# Patient Record
Sex: MALE | Race: ASIAN | Employment: FULL TIME | URBAN - METROPOLITAN AREA
[De-identification: names, ages, dates, MRNs, and addresses within clinical notes are randomized per-mention and may not be internally consistent; named-entity substitution may affect disease eponyms.]

---

## 2018-05-02 ENCOUNTER — OFFICE VISIT (OUTPATIENT)
Dept: FAMILY MEDICINE CLINIC | Facility: CLINIC | Age: 48
End: 2018-05-02
Payer: COMMERCIAL

## 2018-05-02 ENCOUNTER — HOSPITAL ENCOUNTER (OUTPATIENT)
Dept: RADIOLOGY | Facility: HOSPITAL | Age: 48
Discharge: HOME | End: 2018-05-02
Payer: COMMERCIAL

## 2018-05-02 VITALS
HEART RATE: 82 BPM | BODY MASS INDEX: 22.88 KG/M2 | TEMPERATURE: 97.7 F | RESPIRATION RATE: 18 BRPM | SYSTOLIC BLOOD PRESSURE: 116 MMHG | HEIGHT: 64 IN | OXYGEN SATURATION: 98 % | WEIGHT: 134 LBS | DIASTOLIC BLOOD PRESSURE: 76 MMHG

## 2018-05-02 DIAGNOSIS — R05.9 COUGH: ICD-10-CM

## 2018-05-02 DIAGNOSIS — J30.1 SEASONAL ALLERGIC RHINITIS DUE TO POLLEN: Primary | ICD-10-CM

## 2018-05-02 PROBLEM — J30.9 ALLERGIC RHINITIS: Status: ACTIVE | Noted: 2018-05-02

## 2018-05-02 PROCEDURE — 71046 X-RAY EXAM CHEST 2 VIEWS: CPT

## 2018-05-02 PROCEDURE — 99203 OFFICE O/P NEW LOW 30 MIN: CPT | Performed by: NURSE PRACTITIONER

## 2018-05-02 PROCEDURE — 3008F BODY MASS INDEX DOCD: CPT | Performed by: NURSE PRACTITIONER

## 2018-05-02 PROCEDURE — 1036F TOBACCO NON-USER: CPT | Performed by: NURSE PRACTITIONER

## 2018-05-02 RX ORDER — MOMETASONE FUROATE 50 UG/1
2 SPRAY, METERED NASAL DAILY
Qty: 17 G | Refills: 0 | Status: SHIPPED | OUTPATIENT
Start: 2018-05-02

## 2018-05-02 NOTE — ASSESSMENT & PLAN NOTE
To begin over-the-counter antihistamine such as size all or Claritin in addition to Nasonex  To follow up if symptoms do not begin to improve

## 2018-05-02 NOTE — PROGRESS NOTES
Assessment/Plan:    Allergic rhinitis   To begin over-the-counter antihistamine such as size all or Claritin in addition to Nasonex  To follow up if symptoms do not begin to improve  Cough  Lungs clear on exam and patient afebrile  Requesting x-ray  Will call with results       Diagnoses and all orders for this visit:    Seasonal allergic rhinitis due to pollen  -     mometasone (NASONEX) 50 mcg/act nasal spray; 2 sprays into each nostril daily    Cough  -     XR chest pa & lateral; Future          Subjective:      Patient ID: Syl Sanchez is a 41 y o  male  Loc presents for an initial visit  He denies any significant past medical history or past surgical history  He has not been connected to a doctor for several years  He does report developing a dry cough about 2 weeks ago  Loc is also having  itchy eyes and nasal congestion  Denies fever, chills, shortness of breath, wheezing, mucus production, sick contacts, asthma or pneumonia  He does work at a nail salon  He feels that his cough is worse at work as well as at nighttime  He has treated his symptoms by drinking hot water, this provided some relief  Requesting a chest x-ray  The following portions of the patient's history were reviewed and updated as appropriate:   He  has no past medical history on file  He   Patient Active Problem List    Diagnosis Date Noted    Allergic rhinitis 05/02/2018    Cough 05/02/2018     He  has no past surgical history on file  His family history includes Heart disease in his mother; Hyperlipidemia in his father  He  reports that he has never smoked  He has never used smokeless tobacco  He reports that he does not drink alcohol or use drugs  Current Outpatient Prescriptions   Medication Sig Dispense Refill    mometasone (NASONEX) 50 mcg/act nasal spray 2 sprays into each nostril daily 17 g 0     No current facility-administered medications for this visit        No current outpatient prescriptions on file prior to visit  No current facility-administered medications on file prior to visit  He has No Known Allergies       Review of Systems   Constitutional: Negative  HENT: Positive for congestion  Eyes: Positive for itching  Respiratory: Positive for cough  Negative for shortness of breath and wheezing  Cardiovascular: Negative  Gastrointestinal: Negative  Genitourinary: Negative  Neurological: Negative  Psychiatric/Behavioral: Negative  /76   Pulse 82   Temp 97 7 °F (36 5 °C)   Resp 18   Ht 5' 4" (1 626 m)   Wt 60 8 kg (134 lb)   SpO2 98%   BMI 23 00 kg/m²     Objective:     Physical Exam   Constitutional: He is oriented to person, place, and time  He appears well-developed and well-nourished  HENT:   Head: Normocephalic and atraumatic  Right Ear: Tympanic membrane and external ear normal    Left Ear: External ear normal    Mouth/Throat: Uvula is midline and oropharynx is clear and moist  Normal dentition  No oropharyngeal exudate  Nasal mucosa pale and boggy   Eyes: Conjunctivae are normal  Pupils are equal, round, and reactive to light  Neck: Neck supple  No thyromegaly present  Cardiovascular: Normal rate and regular rhythm  No murmur heard  Pulmonary/Chest: Effort normal and breath sounds normal  No respiratory distress  He has no wheezes  He has no rales  He exhibits no tenderness  No cough on exam    Abdominal: Soft  Bowel sounds are normal  He exhibits no distension and no mass  There is no tenderness  There is no rebound and no guarding  Musculoskeletal: Normal range of motion  Lymphadenopathy:     He has no cervical adenopathy  Neurological: He is alert and oriented to person, place, and time  Skin: Skin is warm and dry  Psychiatric: He has a normal mood and affect   His behavior is normal  Judgment and thought content normal

## 2023-09-06 ENCOUNTER — OFFICE VISIT (OUTPATIENT)
Dept: FAMILY MEDICINE CLINIC | Facility: CLINIC | Age: 53
End: 2023-09-06
Payer: MEDICARE

## 2023-09-06 VITALS
OXYGEN SATURATION: 98 % | BODY MASS INDEX: 23.16 KG/M2 | WEIGHT: 139 LBS | SYSTOLIC BLOOD PRESSURE: 130 MMHG | RESPIRATION RATE: 16 BRPM | TEMPERATURE: 96.6 F | HEIGHT: 65 IN | DIASTOLIC BLOOD PRESSURE: 86 MMHG | HEART RATE: 74 BPM

## 2023-09-06 DIAGNOSIS — G44.229 CHRONIC TENSION-TYPE HEADACHE, NOT INTRACTABLE: ICD-10-CM

## 2023-09-06 DIAGNOSIS — R53.82 CHRONIC FATIGUE: ICD-10-CM

## 2023-09-06 DIAGNOSIS — Z12.11 COLON CANCER SCREENING: ICD-10-CM

## 2023-09-06 DIAGNOSIS — Z11.59 NEED FOR HEPATITIS C SCREENING TEST: ICD-10-CM

## 2023-09-06 DIAGNOSIS — Z13.1 SCREENING FOR DIABETES MELLITUS: ICD-10-CM

## 2023-09-06 DIAGNOSIS — Z11.4 SCREENING FOR HIV (HUMAN IMMUNODEFICIENCY VIRUS): ICD-10-CM

## 2023-09-06 DIAGNOSIS — R68.89 FORGETFULNESS: ICD-10-CM

## 2023-09-06 DIAGNOSIS — Z00.01 ENCOUNTER FOR GENERAL ADULT MEDICAL EXAMINATION WITH ABNORMAL FINDINGS: Primary | ICD-10-CM

## 2023-09-06 DIAGNOSIS — Z12.5 PROSTATE CANCER SCREENING ENCOUNTER, OPTIONS AND RISKS DISCUSSED: ICD-10-CM

## 2023-09-06 PROBLEM — J30.9 ALLERGIC RHINITIS: Status: RESOLVED | Noted: 2018-05-02 | Resolved: 2023-09-06

## 2023-09-06 PROCEDURE — 99396 PREV VISIT EST AGE 40-64: CPT | Performed by: FAMILY MEDICINE

## 2023-09-06 PROCEDURE — 99204 OFFICE O/P NEW MOD 45 MIN: CPT | Performed by: FAMILY MEDICINE

## 2023-09-06 NOTE — PROGRESS NOTES
ADULT ANNUAL PHYSICAL  [address] [facility]    NAME: Sabas Welch  AGE: 50 y.o. SEX: male  : 1970     DATE: 2023     Assessment and Plan:     Problem List Items Addressed This Visit    None  Visit Diagnoses     Encounter for general adult medical examination with abnormal findings    -  Primary    Relevant Orders    CBC and differential    Comprehensive metabolic panel    Lipid panel    TSH, 3rd generation with Free T4 reflex    Screening for diabetes mellitus        Relevant Orders    Hemoglobin A1C    Need for hepatitis C screening test        Relevant Orders    Hepatitis C Antibody    Screening for HIV (human immunodeficiency virus)        Relevant Orders    HIV 1/2 AG/AB w Reflex [facility] for 2 yr old and above    Colon cancer screening        Relevant Orders    Ambulatory Referral to Gastroenterology        Failed clock draw, cannot do standardized test as he did not have schooling in [facility] , but can do simple mathematics and normal 3 word recall. 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 Follow up after labs. Immunizations and preventive care screenings were discussed with patient today. Appropriate education was printed on patient's after visit summary. Discussed risks and benefits of prostate cancer screening. We discussed the controversial history of PSA screening for prostate cancer in the [facility] as well as the risk of over detection and over treatment of prostate cancer by way of PSA screening. The patient understands that PSA blood testing is an imperfect way to screen for prostate cancer and that elevated PSA levels in the blood may also be caused by infection, inflammation, prostatic trauma or manipulation, urological procedures, or by benign prostatic enlargement.     The role of the digital rectal examination in prostate cancer screening was also discussed and I discussed with him that there is large interobserver variability in the findings of digital rectal examination. Counseling:  Alcohol/drug use: discussed moderation in alcohol intake, the recommendations for healthy alcohol use, and avoidance of illicit drug use. Dental Health: discussed importance of regular tooth brushing, flossing, and dental visits. Injury prevention: discussed safety/seat belts, safety helmets, smoke detectors, carbon dioxide detectors, and smoking near bedding or upholstery. Sexual health: discussed sexually transmitted diseases, partner selection, use of condoms, avoidance of unintended pregnancy, and contraceptive alternatives. · Exercise: the importance of regular exercise/physical activity was discussed. Recommend exercise 3-5 times per week for at least 30 minutes. Depression Screening and Follow-up Plan: Patient was screened for depression during today's encounter. They screened negative with a PHQ-2 score of 0. No follow-ups on file. Chief Complaint:     Chief Complaint   Patient presents with   • Physical Exam   • Headache      History of Present Illness:     Adult Annual Physical   Patient here for a comprehensive physical exam. The patient reports problems - forgetting things, stress, no alcohol use, intermittent headaches, no recent vision check, family issues. Diet and Physical Activity  · Diet/Nutrition: well balanced diet and consuming 3-5 servings of fruits/vegetables daily. · Exercise: moderate cardiovascular exercise. Depression Screening  PHQ-2/9 Depression Screening    Little interest or pleasure in doing things: 0 - not at all  Feeling down, depressed, or hopeless: 0 - not at all  PHQ-2 Score: 0  PHQ-2 Interpretation: Negative depression screen       General Health  · Sleep: sleeps well. · Hearing: grossly normal.  · Vision: most recent eye exam >1 year ago and wears glasses.    · Dental: no dental visits for >1 year and brushes teeth twice daily.  Health  · Symptoms include: none     Review of Systems:     Review of Systems   Constitutional: Positive for fatigue. Negative for chills and fever. HENT: Negative for congestion, rhinorrhea and sore throat. Eyes: Negative for discharge, redness and itching. Respiratory: Negative for chest tightness, shortness of breath and wheezing. Cardiovascular: Negative for chest pain and palpitations. Gastrointestinal: Negative for abdominal pain, constipation and diarrhea. Genitourinary: Negative for dysuria. Skin: Negative for pallor and rash. Neurological: Positive for headaches. Negative for dizziness, weakness and numbness. Memory problems        Past Medical History:     Past Medical History:   Diagnosis Date   • Allergic rhinitis 5/2/2018      Past Surgical History:     History reviewed. No pertinent surgical history. Family History:     Family History   Problem Relation Age of Onset   • Heart disease Mother    • Hyperlipidemia Father       Social History:     Social History     Socioeconomic History   • Marital status: /Civil Union     Spouse name: None   • Number of children: None   • Years of education: None   • Highest education level: None   Occupational History   • None   Tobacco Use   • Smoking status: Never   • Smokeless tobacco: Never   Vaping Use   • Vaping Use: Never used   Substance and Sexual Activity   • Alcohol use: No   • Drug use: No   • Sexual activity: Yes     Partners: Female   Other Topics Concern   • None   Social History Narrative   • None     Social Determinants of Health     Financial Resource Strain: Not on file   Food Insecurity: Not on file   Transportation Needs: Not on file   Physical Activity: Not on file   Stress: Not on file   Social Connections: Not on file   Intimate Partner Violence: Not on file   Housing Stability: Not on file      Current Medications:     No current outpatient medications on file.      No current facility-administered medications for this visit. Allergies:     No Known Allergies   Physical Exam:     /86 (BP Location: Left arm, Patient Position: Sitting, Cuff Size: Adult)   Pulse 74   Temp (!) 96.6 °F (35.9 °C) (Tympanic)   Resp 16   Ht 5' 5" (1.651 m)   Wt 63 kg (139 lb)   SpO2 98%   BMI 23.13 kg/m²     Physical Exam  Vitals and nursing note reviewed. Constitutional:       General: He is not in acute distress. Appearance: Normal appearance. He is well-developed and normal weight. He is not ill-appearing or toxic-appearing. HENT:      Head: Normocephalic and atraumatic. Right Ear: Tympanic membrane, ear canal and external ear normal.      Left Ear: Tympanic membrane, ear canal and external ear normal.      Nose: No mucosal edema or rhinorrhea. Mouth/Throat:      Mouth: Mucous membranes are not pale, dry and not cyanotic. Pharynx: Oropharynx is clear. No oropharyngeal exudate or posterior oropharyngeal erythema. Eyes:      General: No scleral icterus. Right eye: No discharge. Left eye: No discharge. Extraocular Movements: Extraocular movements intact. Conjunctiva/sclera: Conjunctivae normal.      Pupils: Pupils are equal, round, and reactive to light. Cardiovascular:      Rate and Rhythm: Normal rate and regular rhythm. Heart sounds: Normal heart sounds. No murmur heard. No gallop. Pulmonary:      Effort: Pulmonary effort is normal. No respiratory distress. Breath sounds: Normal breath sounds. No wheezing or rales. Abdominal:      General: Abdomen is flat. Palpations: Abdomen is soft. Tenderness: There is no abdominal tenderness. Musculoskeletal:         General: No swelling, tenderness, deformity or signs of injury. Cervical back: Normal range of motion. Right lower leg: No edema. Left lower leg: No edema. Skin:     General: Skin is warm. Coloration: Skin is not jaundiced or pale. Findings: No rash. Neurological:      General: No focal deficit present. Mental Status: He is alert and oriented to person, place, and time. Cranial Nerves: No cranial nerve deficit. Sensory: No sensory deficit. Motor: No weakness. Coordination: Coordination normal.      Gait: Gait normal.      Deep Tendon Reflexes: Reflexes normal.      Comments: Failed clock draw  No formal education in English- high school in Portuguese     Psychiatric:         Mood and Affect: Mood normal.         Behavior: Behavior normal.         Thought Content:  Thought content normal.         Judgment: Judgment normal.          Lexii Fernandes MD  [id number] [facility]

## 2023-09-11 ENCOUNTER — APPOINTMENT (OUTPATIENT)
Dept: LAB | Facility: HOSPITAL | Age: 53
End: 2023-09-11
Attending: FAMILY MEDICINE
Payer: MEDICARE

## 2023-09-11 DIAGNOSIS — Z11.59 NEED FOR HEPATITIS C SCREENING TEST: ICD-10-CM

## 2023-09-11 DIAGNOSIS — Z13.1 SCREENING FOR DIABETES MELLITUS: ICD-10-CM

## 2023-09-11 DIAGNOSIS — Z11.4 SCREENING FOR HIV (HUMAN IMMUNODEFICIENCY VIRUS): ICD-10-CM

## 2023-09-11 DIAGNOSIS — R53.82 CHRONIC FATIGUE: ICD-10-CM

## 2023-09-11 DIAGNOSIS — Z00.01 ENCOUNTER FOR GENERAL ADULT MEDICAL EXAMINATION WITH ABNORMAL FINDINGS: ICD-10-CM

## 2023-09-11 DIAGNOSIS — R68.89 FORGETFULNESS: ICD-10-CM

## 2023-09-11 DIAGNOSIS — Z12.5 PROSTATE CANCER SCREENING ENCOUNTER, OPTIONS AND RISKS DISCUSSED: ICD-10-CM

## 2023-09-11 LAB
25(OH)D3 SERPL-MCNC: 18.6 NG/ML (ref 30–100)
ALBUMIN SERPL BCP-MCNC: 4.5 G/DL (ref 3.5–5)
ALP SERPL-CCNC: 65 U/L (ref 34–104)
ALT SERPL W P-5'-P-CCNC: 13 U/L (ref 7–52)
ANION GAP SERPL CALCULATED.3IONS-SCNC: 7 MMOL/L
AST SERPL W P-5'-P-CCNC: 15 U/L (ref 13–39)
BASOPHILS # BLD AUTO: 0.03 THOUSANDS/ÂΜL (ref 0–0.1)
BASOPHILS NFR BLD AUTO: 1 % (ref 0–1)
BILIRUB SERPL-MCNC: 0.78 MG/DL (ref 0.2–1)
BUN SERPL-MCNC: 13 MG/DL (ref 5–25)
CALCIUM SERPL-MCNC: 9.6 MG/DL (ref 8.4–10.2)
CHLORIDE SERPL-SCNC: 102 MMOL/L (ref 96–108)
CHOLEST SERPL-MCNC: 197 MG/DL
CO2 SERPL-SCNC: 29 MMOL/L (ref 21–32)
CREAT SERPL-MCNC: 0.78 MG/DL (ref 0.6–1.3)
EOSINOPHIL # BLD AUTO: 0.23 THOUSAND/ÂΜL (ref 0–0.61)
EOSINOPHIL NFR BLD AUTO: 4 % (ref 0–6)
ERYTHROCYTE [DISTWIDTH] IN BLOOD BY AUTOMATED COUNT: 12.5 % (ref 11.6–15.1)
EST. AVERAGE GLUCOSE BLD GHB EST-MCNC: 140 MG/DL
FOLATE SERPL-MCNC: 14.4 NG/ML
GFR SERPL CREATININE-BSD FRML MDRD: 105 ML/MIN/1.73SQ M
GLUCOSE P FAST SERPL-MCNC: 117 MG/DL (ref 65–99)
HBA1C MFR BLD: 6.5 %
HCT VFR BLD AUTO: 44.7 % (ref 36.5–49.3)
HCV AB SER QL: NORMAL
HDLC SERPL-MCNC: 40 MG/DL
HGB BLD-MCNC: 14.6 G/DL (ref 12–17)
HIV 1+2 AB+HIV1 P24 AG SERPL QL IA: NORMAL
HIV 2 AB SERPL QL IA: NORMAL
HIV1 AB SERPL QL IA: NORMAL
HIV1 P24 AG SERPL QL IA: NORMAL
IMM GRANULOCYTES # BLD AUTO: 0.02 THOUSAND/UL (ref 0–0.2)
IMM GRANULOCYTES NFR BLD AUTO: 0 % (ref 0–2)
LDLC SERPL CALC-MCNC: 131 MG/DL (ref 0–100)
LYMPHOCYTES # BLD AUTO: 1.75 THOUSANDS/ÂΜL (ref 0.6–4.47)
LYMPHOCYTES NFR BLD AUTO: 34 % (ref 14–44)
MCH RBC QN AUTO: 28.5 PG (ref 26.8–34.3)
MCHC RBC AUTO-ENTMCNC: 32.7 G/DL (ref 31.4–37.4)
MCV RBC AUTO: 87 FL (ref 82–98)
MONOCYTES # BLD AUTO: 0.35 THOUSAND/ÂΜL (ref 0.17–1.22)
MONOCYTES NFR BLD AUTO: 7 % (ref 4–12)
NEUTROPHILS # BLD AUTO: 2.82 THOUSANDS/ÂΜL (ref 1.85–7.62)
NEUTS SEG NFR BLD AUTO: 54 % (ref 43–75)
NONHDLC SERPL-MCNC: 157 MG/DL
NRBC BLD AUTO-RTO: 0 /100 WBCS
PLATELET # BLD AUTO: 222 THOUSANDS/UL (ref 149–390)
PMV BLD AUTO: 10.3 FL (ref 8.9–12.7)
POTASSIUM SERPL-SCNC: 3.9 MMOL/L (ref 3.5–5.3)
PROT SERPL-MCNC: 8 G/DL (ref 6.4–8.4)
RBC # BLD AUTO: 5.12 MILLION/UL (ref 3.88–5.62)
SODIUM SERPL-SCNC: 138 MMOL/L (ref 135–147)
TRIGL SERPL-MCNC: 129 MG/DL
TSH SERPL DL<=0.05 MIU/L-ACNC: 0.8 UIU/ML (ref 0.45–4.5)
VIT B12 SERPL-MCNC: 302 PG/ML (ref 180–914)
WBC # BLD AUTO: 5.2 THOUSAND/UL (ref 4.31–10.16)

## 2023-09-11 PROCEDURE — 84154 ASSAY OF PSA FREE: CPT

## 2023-09-11 PROCEDURE — 82607 VITAMIN B-12: CPT

## 2023-09-11 PROCEDURE — 80061 LIPID PANEL: CPT

## 2023-09-11 PROCEDURE — 86803 HEPATITIS C AB TEST: CPT

## 2023-09-11 PROCEDURE — 84153 ASSAY OF PSA TOTAL: CPT

## 2023-09-11 PROCEDURE — 36415 COLL VENOUS BLD VENIPUNCTURE: CPT

## 2023-09-11 PROCEDURE — 87389 HIV-1 AG W/HIV-1&-2 AB AG IA: CPT

## 2023-09-11 PROCEDURE — 82746 ASSAY OF FOLIC ACID SERUM: CPT

## 2023-09-11 PROCEDURE — 83036 HEMOGLOBIN GLYCOSYLATED A1C: CPT

## 2023-09-11 PROCEDURE — 82306 VITAMIN D 25 HYDROXY: CPT

## 2023-09-11 PROCEDURE — 80053 COMPREHEN METABOLIC PANEL: CPT

## 2023-09-11 PROCEDURE — 85025 COMPLETE CBC W/AUTO DIFF WBC: CPT

## 2023-09-11 PROCEDURE — 84443 ASSAY THYROID STIM HORMONE: CPT

## 2023-09-12 LAB
PSA FREE MFR SERPL: 50 %
PSA FREE SERPL-MCNC: 0.3 NG/ML
PSA SERPL-MCNC: 0.6 NG/ML (ref 0–4)

## 2023-09-19 ENCOUNTER — OFFICE VISIT (OUTPATIENT)
Dept: FAMILY MEDICINE CLINIC | Facility: CLINIC | Age: 53
End: 2023-09-19
Payer: MEDICARE

## 2023-09-19 VITALS
TEMPERATURE: 97.4 F | HEIGHT: 65 IN | OXYGEN SATURATION: 98 % | WEIGHT: 138 LBS | BODY MASS INDEX: 22.99 KG/M2 | SYSTOLIC BLOOD PRESSURE: 132 MMHG | DIASTOLIC BLOOD PRESSURE: 82 MMHG | HEART RATE: 74 BPM | RESPIRATION RATE: 14 BRPM

## 2023-09-19 DIAGNOSIS — E11.9 TYPE 2 DIABETES MELLITUS WITHOUT COMPLICATION, WITHOUT LONG-TERM CURRENT USE OF INSULIN (HCC): Primary | ICD-10-CM

## 2023-09-19 DIAGNOSIS — R06.83 SNORING: ICD-10-CM

## 2023-09-19 DIAGNOSIS — R51.9 MORNING HEADACHE: ICD-10-CM

## 2023-09-19 DIAGNOSIS — Z23 ENCOUNTER FOR IMMUNIZATION: ICD-10-CM

## 2023-09-19 DIAGNOSIS — R06.81 WITNESSED EPISODE OF APNEA: ICD-10-CM

## 2023-09-19 DIAGNOSIS — Z91.89 AT RISK FOR OBSTRUCTIVE SLEEP APNEA: ICD-10-CM

## 2023-09-19 PROCEDURE — 90471 IMMUNIZATION ADMIN: CPT | Performed by: FAMILY MEDICINE

## 2023-09-19 PROCEDURE — 90472 IMMUNIZATION ADMIN EACH ADD: CPT | Performed by: FAMILY MEDICINE

## 2023-09-19 PROCEDURE — 90750 HZV VACC RECOMBINANT IM: CPT | Performed by: FAMILY MEDICINE

## 2023-09-19 PROCEDURE — 99214 OFFICE O/P EST MOD 30 MIN: CPT | Performed by: FAMILY MEDICINE

## 2023-09-19 PROCEDURE — 90677 PCV20 VACCINE IM: CPT | Performed by: FAMILY MEDICINE

## 2023-09-19 RX ORDER — ATORVASTATIN CALCIUM 10 MG/1
10 TABLET, FILM COATED ORAL
Qty: 30 TABLET | Refills: 5 | Status: SHIPPED | OUTPATIENT
Start: 2023-09-19

## 2023-09-19 NOTE — PATIENT INSTRUCTIONS
You have been diagnosed with type 2 diabetes as discussed today. Low-carb diet cut back on rice. Start taking metformin 30 minutes before breakfast daily in the morning. He will start taking it lovastatin 10 mg daily at bedtime. When you go for eye checkup today have him fill out the form. You will call and schedule the colonoscopy  You will check your feet daily for any breaks in skin and moisturize her feet daily. I have ordered the sleep study today so we will call and schedule the sleep study to rule out sleep apnea.   If the study is positive you may need another study with a CPAP machine which she might have to wear while sleeping  You will obtain blood work in 3 months, follow-up with me after the blood work

## 2023-09-19 NOTE — PROGRESS NOTES
Subjective:      Patient ID: Tanya Rhodes is a 50 y.o. male. Here for follow up on labs  But also states that he had episodes where he gasps for air and stops breathing at night, wife reports he snores    STOP-Bang Questionnaire    Is it possible that you have . .. Obstructive Sleep Apnea (DULCE MARIA)? Please answer the following questions below to determine if you might be at risk. Yes=1? No=0  ? Snoring ? Do you Snore Loudly (loud enough to be heard through closed doors or your bed-partner elbows you for snoring at night)? Yes=1   ? Tired ? Do you often feel Tired, Fatigued, or Sleepy during the daytime (such as falling asleep during driving or talking to someone)? Yes=1   ? Observed ? Has anyone Observed you Stop Breathing or Choking/Gasping during your sleep ? Yes=1,  ? Pressure ? Do you have or are being treated for High Blood Pressure ?   ,No=0  ? Body Mass Index more than 35 kg/m2? Body mass index is 22.96 kg/m². ?      No=0  ? Age older than 48 ? Yes=1? Neck size large ? (Measured around [place])  For male, is your shirt collar 17 inches / 43cm or larger? For female, is your shirt collar 16 inches / 41cm or larger? No=0  ? Gender = Male ? Yes=1,    For general population     DULCE MARIA - High Risk : Yes to 5 - 8 questions    If the STOP-Bang score is 5 vs. 0-2, the risk of obstructive sleep apnea is 5 fold. Past Medical History:   Diagnosis Date   • Allergic rhinitis 5/2/2018       Family History   Problem Relation Age of Onset   • Heart disease Mother    • Hyperlipidemia Father        History reviewed. No pertinent surgical history. reports that he has never smoked. He has never used smokeless tobacco. He reports that he does not drink alcohol and does not use drugs.       Current Outpatient Medications:   •  atorvastatin (LIPITOR) 10 mg tablet, Take 1 tablet (10 mg total) by mouth daily at bedtime, Disp: 30 tablet, Rfl: 5  •  metFORMIN (GLUCOPHAGE) 500 mg tablet, Take 1 tablet (500 mg total) by mouth daily with breakfast, Disp: 90 tablet, Rfl: 1    The following portions of the patient's history were reviewed and updated as appropriate: allergies, current medications, past family history, past medical history, past social history, past surgical history and problem list.    Review of Systems   Constitutional: Negative for chills and fever. HENT: Negative for congestion, rhinorrhea and sore throat. Eyes: Negative for discharge, redness and itching. Respiratory: Negative for chest tightness, shortness of breath and wheezing. Cardiovascular: Negative for chest pain and palpitations. Gastrointestinal: Negative for abdominal pain, constipation and diarrhea. Genitourinary: Negative for dysuria. Skin: Negative for pallor and rash. Neurological: Negative for dizziness, weakness, numbness and headaches. Psychiatric/Behavioral: Positive for sleep disturbance. PHQ-2/9 Depression Screening    Little interest or pleasure in doing things: 0 - not at all  Feeling down, depressed, or hopeless: 0 - not at all  PHQ-2 Score: 0  PHQ-2 Interpretation: Negative depression screen             Objective:    /82 (BP Location: Left arm, Patient Position: Sitting, Cuff Size: Adult)   Pulse 74   Temp (!) 97.4 °F (36.3 °C) (Tympanic)   Resp 14   Ht 5' 5" (1.651 m)   Wt 62.6 kg (138 lb)   SpO2 98%   BMI 22.96 kg/m²      Physical Exam  Vitals and nursing note reviewed. Constitutional:       Appearance: Normal appearance. HENT:      Right Ear: External ear normal.      Left Ear: External ear normal.   Eyes:      General:         Right eye: No discharge. Left eye: No discharge. Conjunctiva/sclera: Conjunctivae normal.   Cardiovascular:      Rate and Rhythm: Normal rate and regular rhythm. Pulses: no weak pulses          Dorsalis pedis pulses are 2+ on the right side and 2+ on the left side. Heart sounds: No murmur heard.   Pulmonary:      Effort: Pulmonary effort is normal.      Breath sounds: Normal breath sounds. No wheezing. Abdominal:      General: There is no distension. Palpations: Abdomen is soft. Tenderness: There is no abdominal tenderness. Musculoskeletal:      Right lower leg: No edema. Left lower leg: No edema. Feet:      Right foot:      Skin integrity: No ulcer, skin breakdown, erythema, warmth, callus or dry skin. Left foot:      Skin integrity: No ulcer, skin breakdown, erythema, warmth, callus or dry skin. Skin:     Findings: No lesion or rash. Neurological:      Mental Status: He is alert. Mental status is at baseline. Psychiatric:         Mood and Affect: Mood normal.         Thought Content: Thought content normal.       Patient's shoes and socks removed. Right Foot/Ankle   Right Foot Inspection  Skin Exam: skin normal. Skin not intact, no dry skin, no warmth, no callus, no erythema, no maceration, no abnormal color, no pre-ulcer, no ulcer and no callus. Toe Exam: ROM and strength within normal limits. Sensory   Monofilament testing: intact    Vascular  Capillary refills: < 3 seconds  The right DP pulse is 2+. Right Toe  - Comprehensive Exam  Ecchymosis: none  Swelling: none   Tenderness: none         Left Foot/Ankle  Left Foot Inspection  Skin Exam: skin normal. Skin not intact, no dry skin, no warmth, no erythema, no maceration, normal color, no pre-ulcer, no ulcer and no callus. Toe Exam: ROM and strength within normal limits. Sensory   Monofilament testing: intact    Vascular  Capillary refills: < 3 seconds  The left DP pulse is 2+.      Left Toe  - Comprehensive Exam  Ecchymosis: none  Swelling: none   Tenderness: none           Assign Risk Category  No deformity present  No loss of protective sensation  No weak pulses  Risk: 0          Recent Results (from the past 8736 hour(s))   CBC and differential    Collection Time: 09/11/23  9:08 AM   Result Value Ref Range    WBC 5.20 4.31 - 10.16 Thousand/uL    RBC 5.12 3.88 - 5.62 Million/uL    Hemoglobin 14.6 12.0 - 17.0 g/dL    Hematocrit 44.7 36.5 - 49.3 %    MCV 87 82 - 98 fL    MCH 28.5 26.8 - 34.3 pg    MCHC 32.7 31.4 - 37.4 g/dL    RDW 12.5 11.6 - 15.1 %    MPV 10.3 8.9 - 12.7 fL    Platelets 776 520 - 986 Thousands/uL    nRBC 0 /100 WBCs    Neutrophils Relative 54 43 - 75 %    Immat GRANS % 0 0 - 2 %    Lymphocytes Relative 34 14 - 44 %    Monocytes Relative 7 4 - 12 %    Eosinophils Relative 4 0 - 6 %    Basophils Relative 1 0 - 1 %    Neutrophils Absolute 2.82 1.85 - 7.62 Thousands/µL    Immature Grans Absolute 0.02 0.00 - 0.20 Thousand/uL    Lymphocytes Absolute 1.75 0.60 - 4.47 Thousands/µL    Monocytes Absolute 0.35 0.17 - 1.22 Thousand/µL    Eosinophils Absolute 0.23 0.00 - 0.61 Thousand/µL    Basophils Absolute 0.03 0.00 - 0.10 Thousands/µL   Comprehensive metabolic panel    Collection Time: 09/11/23  9:08 AM   Result Value Ref Range    Sodium 138 135 - 147 mmol/L    Potassium 3.9 3.5 - 5.3 mmol/L    Chloride 102 96 - 108 mmol/L    CO2 29 21 - 32 mmol/L    ANION GAP 7 mmol/L    BUN 13 5 - 25 mg/dL    Creatinine 0.78 0.60 - 1.30 mg/dL    Glucose, Fasting 117 (H) 65 - 99 mg/dL    Calcium 9.6 8.4 - 10.2 mg/dL    AST 15 13 - 39 U/L    ALT 13 7 - 52 U/L    Alkaline Phosphatase 65 34 - 104 U/L    Total Protein 8.0 6.4 - 8.4 g/dL    Albumin 4.5 3.5 - 5.0 g/dL    Total Bilirubin 0.78 0.20 - 1.00 mg/dL    eGFR 105 ml/min/1.73sq m   Lipid panel    Collection Time: 09/11/23  9:08 AM   Result Value Ref Range    Cholesterol 197 See Comment mg/dL    Triglycerides 129 See Comment mg/dL    HDL, Direct 40 >=40 mg/dL    LDL Calculated 131 (H) 0 - 100 mg/dL    Non-HDL-Chol (CHOL-HDL) 157 mg/dl   Hemoglobin A1C    Collection Time: 09/11/23  9:08 AM   Result Value Ref Range    Hemoglobin A1C 6.5 (H) Normal 4.0-5.6%; PreDiabetic 5.7-6.4%;  Diabetic >=6.5%; Glycemic control for adults with diabetes <7.0% %     mg/dl   TSH, 3rd generation with Free T4 reflex    Collection Time: 09/11/23  9:08 AM   Result Value Ref Range    TSH 3RD GENERATON 0.798 0.450 - 4.500 uIU/mL   Hepatitis C Antibody    Collection Time: 09/11/23  9:08 AM   Result Value Ref Range    Hepatitis C Ab Non-reactive Non-Reactive   HIV 1/2 AG/AB w Reflex [facility] for 2 yr old and above    Collection Time: 09/11/23  9:08 AM   Result Value Ref Range    HIV-1 p24 Antigen Non-Reactive Non-Reactive    HIV-1 Antibody Non-Reactive Non-Reactive    HIV-2 Antibody Non-Reactive Non-Reactive    HIV Ag-Ab 5th Gen Non-Reactive Non-Reactive   PSA, total and free    Collection Time: 09/11/23  9:08 AM   Result Value Ref Range    Prostate Specific Antigen Total 0.6 0.0 - 4.0 ng/mL    PSA, Free 0.30 N/A ng/mL    PSA, Free Pct 50.0 %   Vitamin D 25 hydroxy    Collection Time: 09/11/23  9:08 AM   Result Value Ref Range    Vit D, 25-Hydroxy 18.6 (L) 30.0 - 100.0 ng/mL   Vitamin B12    Collection Time: 09/11/23  9:08 AM   Result Value Ref Range    Vitamin B-12 302 180 - 914 pg/mL   Folate    Collection Time: 09/11/23  9:08 AM   Result Value Ref Range    Folate 14.4 >5.9 ng/mL       Laboratory Results: I have personally reviewed the pertinent laboratory results/reports     Radiology/Other Diagnostic Testing Results: I have personally reviewed pertinent reports. XR chest pa & lateral    Result Date: 5/2/2018  CHEST INDICATION:   R05: Cough. COMPARISON:  None EXAM PERFORMED/VIEWS:  XR CHEST PA & LATERAL FINDINGS: Cardiomediastinal silhouette appears unremarkable. The lungs are clear. No pneumothorax or pleural effusion. Osseous structures appear within normal limits for patient age.      No acute consolidation or congestion seen Workstation performed: [id number]        Assessment/Plan:  Problem List Items Addressed This Visit    None  Visit Diagnoses     Type 2 diabetes mellitus without complication, without long-term current use of insulin (HCC)    -  Primary    Relevant Medications    metFORMIN (GLUCOPHAGE) 500 mg tablet    atorvastatin (LIPITOR) 10 mg tablet    Other Relevant Orders    Hemoglobin A1C    Comprehensive metabolic panel    Snoring        Relevant Orders    Diagnostic Sleep Study    Witnessed episode of apnea        Relevant Orders    Diagnostic Sleep Study    Morning headache        Relevant Orders    Diagnostic Sleep Study    At risk for obstructive sleep apnea        Relevant Orders    Diagnostic Sleep Study    Encounter for immunization        Relevant Orders    Zoster Vaccine Recombinant IM    Pneumococcal Conjugate Vaccine 20-valent (Pcv20)            At risk for sleep apnea, obtain sleep study    Patient has new onset type 2 diabetes-discussed and given handout for diabetic low glycemic foods, foot care, symptoms of hyper and hypoglycemia and goals of diabetes control. Discussed importance of annual diabetic eye exam, urine check and Pneumovax once before 75 yo with patient. I will start patient on metformin 500 mg with breakfast. Type 2 diabetes mellitus with A1C-6.5, discussed ascvd RISK OF >10% warrants statin, which is prescribed today-atorvastatin 10 mg daily. Common side effects of the medications discussed, maintain good personal hygiene to avoid UTIs,  He has eye exam scheduled for today and will get the form filled from them. shigrix and pcv-20 today  F/u in 3months           Read package inserts for all medications before starting a new medications, call me if you have any questions. Patient was given opportunity to ask questions and all questions were answered. Disclaimer: Portions of the record may have been created with voice recognition software. Occasional wrong word or "sound a like" substitutions may have occurred due to the inherent limitations of voice recognition software. Read the chart carefully and recognize, using context, where substitutions have occurred. I have used the Epic copy/forward function to compose this note.  I have reviewed my current note to ensure it reflects the current patient status, exam, assessment and plan.

## 2023-11-14 ENCOUNTER — TELEPHONE (OUTPATIENT)
Age: 53
End: 2023-11-14

## 2023-11-14 NOTE — TELEPHONE ENCOUNTER
11/14/23  Screened by: Jessi Barr    Referring Provider Kristy Neville    Pre- Screening: There is no height or weight on file to calculate BMI. Has patient been referred for a routine screening Colonoscopy? yes  Is the patient between 43-73 years old? yes      Previous Colonoscopy no   If yes:    Date:     Facility:     Reason:       SCHEDULING STAFF: If the patient is between 45yrs-49yrs, please advise patient to confirm benefits/coverage with their insurance company for a routine screening colonoscopy, some insurance carriers will only cover at [address] [place] [address] or older. If the patient is over 66years old, please schedule an office visit. Does the patient want to see a Gastroenterologist prior to their procedure OR are they having any GI symptoms? no    Has the patient been hospitalized or had abdominal surgery in the past 6 months? no    Does the patient use supplemental oxygen? no    Does the patient take Coumadin, Lovenox, Plavix, Elliquis, Xarelto, or other blood thinning medication? no    Has the patient had a stroke, cardiac event, or stent placed in the past year? no    PT PASSED OA    SCHEDULING STAFF: If patient answers NO to above questions, then schedule procedure. If patient answers YES to above questions, then schedule office appointment. If patient is between 45yrs - 49yrs, please advise patient that we will have to confirm benefits & coverage with their insurance company for a routine screening colonoscopy.

## 2023-12-11 ENCOUNTER — APPOINTMENT (OUTPATIENT)
Dept: LAB | Facility: HOSPITAL | Age: 53
End: 2023-12-11
Attending: FAMILY MEDICINE
Payer: MEDICARE

## 2023-12-11 DIAGNOSIS — E11.9 TYPE 2 DIABETES MELLITUS WITHOUT COMPLICATION, WITHOUT LONG-TERM CURRENT USE OF INSULIN (HCC): ICD-10-CM

## 2023-12-11 LAB
ALBUMIN SERPL BCP-MCNC: 4.6 G/DL (ref 3.5–5)
ALP SERPL-CCNC: 71 U/L (ref 34–104)
ALT SERPL W P-5'-P-CCNC: 18 U/L (ref 7–52)
ANION GAP SERPL CALCULATED.3IONS-SCNC: 7 MMOL/L
AST SERPL W P-5'-P-CCNC: 17 U/L (ref 13–39)
BILIRUB SERPL-MCNC: 0.67 MG/DL (ref 0.2–1)
BUN SERPL-MCNC: 11 MG/DL (ref 5–25)
CALCIUM SERPL-MCNC: 9.8 MG/DL (ref 8.4–10.2)
CHLORIDE SERPL-SCNC: 103 MMOL/L (ref 96–108)
CO2 SERPL-SCNC: 29 MMOL/L (ref 21–32)
CREAT SERPL-MCNC: 0.74 MG/DL (ref 0.6–1.3)
EST. AVERAGE GLUCOSE BLD GHB EST-MCNC: 131 MG/DL
GFR SERPL CREATININE-BSD FRML MDRD: 106 ML/MIN/1.73SQ M
GLUCOSE P FAST SERPL-MCNC: 115 MG/DL (ref 65–99)
HBA1C MFR BLD: 6.2 %
POTASSIUM SERPL-SCNC: 4 MMOL/L (ref 3.5–5.3)
PROT SERPL-MCNC: 7.9 G/DL (ref 6.4–8.4)
SODIUM SERPL-SCNC: 139 MMOL/L (ref 135–147)

## 2023-12-11 PROCEDURE — 36415 COLL VENOUS BLD VENIPUNCTURE: CPT

## 2023-12-11 PROCEDURE — 83036 HEMOGLOBIN GLYCOSYLATED A1C: CPT

## 2023-12-11 PROCEDURE — 80053 COMPREHEN METABOLIC PANEL: CPT

## 2024-02-21 PROBLEM — R05.9 COUGH: Status: RESOLVED | Noted: 2018-05-02 | Resolved: 2024-02-21

## 2024-03-21 DIAGNOSIS — E11.9 TYPE 2 DIABETES MELLITUS WITHOUT COMPLICATION, WITHOUT LONG-TERM CURRENT USE OF INSULIN (HCC): ICD-10-CM

## 2024-03-26 DIAGNOSIS — E11.9 TYPE 2 DIABETES MELLITUS WITHOUT COMPLICATION, WITHOUT LONG-TERM CURRENT USE OF INSULIN (HCC): ICD-10-CM

## 2024-03-26 RX ORDER — ATORVASTATIN CALCIUM 10 MG/1
10 TABLET, FILM COATED ORAL
Qty: 30 TABLET | Refills: 5 | Status: SHIPPED | OUTPATIENT
Start: 2024-03-26

## 2024-06-19 DIAGNOSIS — Z12.11 COLON CANCER SCREENING: Primary | ICD-10-CM

## 2024-06-20 ENCOUNTER — OFFICE VISIT (OUTPATIENT)
Dept: FAMILY MEDICINE CLINIC | Facility: CLINIC | Age: 54
End: 2024-06-20
Payer: MEDICARE

## 2024-06-20 VITALS
SYSTOLIC BLOOD PRESSURE: 114 MMHG | BODY MASS INDEX: 22.88 KG/M2 | OXYGEN SATURATION: 98 % | HEIGHT: 64 IN | WEIGHT: 134 LBS | HEART RATE: 68 BPM | DIASTOLIC BLOOD PRESSURE: 80 MMHG | TEMPERATURE: 97.2 F | RESPIRATION RATE: 16 BRPM

## 2024-06-20 DIAGNOSIS — J45.990 EXERCISE-INDUCED ASTHMA: Primary | ICD-10-CM

## 2024-06-20 DIAGNOSIS — J30.1 SEASONAL ALLERGIC RHINITIS DUE TO POLLEN: ICD-10-CM

## 2024-06-20 PROCEDURE — 99214 OFFICE O/P EST MOD 30 MIN: CPT | Performed by: FAMILY MEDICINE

## 2024-06-20 RX ORDER — FLUTICASONE PROPIONATE 50 MCG
1 SPRAY, SUSPENSION (ML) NASAL DAILY
Qty: 18 G | Refills: 0 | Status: SHIPPED | OUTPATIENT
Start: 2024-06-20

## 2024-06-20 RX ORDER — CETIRIZINE HYDROCHLORIDE 10 MG/1
10 TABLET ORAL DAILY
Qty: 30 TABLET | Refills: 2 | Status: SHIPPED | OUTPATIENT
Start: 2024-06-20

## 2024-06-20 RX ORDER — ALBUTEROL SULFATE 90 UG/1
2 AEROSOL, METERED RESPIRATORY (INHALATION) EVERY 6 HOURS PRN
Qty: 6.7 G | Refills: 1 | Status: SHIPPED | OUTPATIENT
Start: 2024-06-20

## 2024-06-20 NOTE — PATIENT INSTRUCTIONS
It is possible that you have exercise-induced asthma.  Start taking albuterol inhaler 2 puffs 20 minutes before exercising or exercising or for shortness of breath or wheezing.  You can repeat this in 4 to 6 hours.    You possibly have underlying seasonal allergies you can start using cetirizine 10 mg daily and Flonase nasal spray to decrease the nasal secretions.  In order to use the Flonase appointment nozzle inside the nostril towards the ear of that side and gently sniff.  Do not sniff too hard as you will swallow the medication instead of allowing it to work in the nostril.

## 2024-07-03 LAB — COLOGUARD RESULT REPORTABLE: NEGATIVE

## 2024-07-30 DIAGNOSIS — J45.990 EXERCISE-INDUCED ASTHMA: ICD-10-CM

## 2024-07-30 RX ORDER — ALBUTEROL SULFATE 90 UG/1
2 AEROSOL, METERED RESPIRATORY (INHALATION) EVERY 6 HOURS PRN
Qty: 6.7 G | Refills: 1 | Status: SHIPPED | OUTPATIENT
Start: 2024-07-30

## 2024-08-19 DIAGNOSIS — J45.990 EXERCISE-INDUCED ASTHMA: ICD-10-CM

## 2024-08-19 DIAGNOSIS — J30.1 SEASONAL ALLERGIC RHINITIS DUE TO POLLEN: ICD-10-CM

## 2024-08-20 RX ORDER — CETIRIZINE HYDROCHLORIDE 10 MG/1
10 TABLET ORAL DAILY
Qty: 30 TABLET | Refills: 5 | Status: SHIPPED | OUTPATIENT
Start: 2024-08-20

## 2024-08-27 DIAGNOSIS — J30.1 SEASONAL ALLERGIC RHINITIS DUE TO POLLEN: ICD-10-CM

## 2024-08-28 RX ORDER — FLUTICASONE PROPIONATE 50 MCG
SPRAY, SUSPENSION (ML) NASAL
Qty: 16 ML | Refills: 1 | Status: SHIPPED | OUTPATIENT
Start: 2024-08-28

## 2024-09-11 DIAGNOSIS — E11.9 TYPE 2 DIABETES MELLITUS WITHOUT COMPLICATION, WITHOUT LONG-TERM CURRENT USE OF INSULIN (HCC): ICD-10-CM

## 2024-09-19 NOTE — PROGRESS NOTES
Problem: PAIN - ADULT  Goal: Verbalizes/displays adequate comfort level or baseline comfort level  Description: Interventions:  - Encourage patient to monitor pain and request assistance  - Assess pain using appropriate pain scale  - Administer analgesics based on type and severity of pain and evaluate response  - Implement non-pharmacological measures as appropriate and evaluate response  - Consider cultural and social influences on pain and pain management  - Notify physician/advanced practitioner if interventions unsuccessful or patient reports new pain  Outcome: Progressing     Problem: Nutrition/Hydration-ADULT  Goal: Nutrient/Hydration intake appropriate for improving, restoring or maintaining nutritional needs  Description: Monitor and assess patient's nutrition/hydration status for malnutrition. Collaborate with interdisciplinary team and initiate plan and interventions as ordered.  Monitor patient's weight and dietary intake as ordered or per policy. Utilize nutrition screening tool and intervene as necessary. Determine patient's food preferences and provide high-protein, high-caloric foods as appropriate.     INTERVENTIONS:  - Monitor oral intake, urinary output, labs, and treatment plans  - Assess nutrition and hydration status and recommend course of action  - Evaluate amount of meals eaten  - Assist patient with eating if necessary   - Allow adequate time for meals  - Recommend/ encourage appropriate diets, oral nutritional supplements, and vitamin/mineral supplements  - Order, calculate, and assess calorie counts as needed  - Recommend, monitor, and adjust tube feedings and TPN/PPN based on assessed needs  - Assess need for intravenous fluids  - Provide specific nutrition/hydration education as appropriate  - Include patient/family/caregiver in decisions related to nutrition  Outcome: Progressing        Subjective:      Patient ID: Marcos Washington is a 53 y.o. male.    Reports that for the past 2 weeks he has been feeling short of breath when he exercises.  He denies any chest pain.  He reports dry cough.  He states that he usually exercises before sleeping and that makes him sleep better.  Cough is mainly bothersome at night.    Shortness of Breath  Pertinent negatives include no chest pain, dizziness, palpitations, rhinorrhea, sore throat or wheezing.       Past Medical History:   Diagnosis Date   • Allergic rhinitis 5/2/2018       Family History   Problem Relation Age of Onset   • Heart disease Mother    • Hyperlipidemia Father        History reviewed. No pertinent surgical history.     reports that he has never smoked. He has never used smokeless tobacco. He reports that he does not drink alcohol and does not use drugs.      Current Outpatient Medications:   •  albuterol (Proventil HFA) 90 mcg/act inhaler, Inhale 2 puffs every 6 (six) hours as needed for wheezing or shortness of breath (20 min before exercise), Disp: 6.7 g, Rfl: 1  •  atorvastatin (LIPITOR) 10 mg tablet, TAKE 1 TABLET BY MOUTH DAILY AT BEDTIME, Disp: 30 tablet, Rfl: 5  •  cetirizine (ZyrTEC) 10 mg tablet, Take 1 tablet (10 mg total) by mouth daily, Disp: 30 tablet, Rfl: 2  •  fluticasone (FLONASE) 50 mcg/act nasal spray, 1 spray into each nostril daily, Disp: 18 g, Rfl: 0  •  metFORMIN (GLUCOPHAGE) 500 mg tablet, TAKE 1 TABLET BY MOUTH EVERY DAY WITH BREAKFAST, Disp: 90 tablet, Rfl: 1    The following portions of the patient's history were reviewed and updated as appropriate: allergies, current medications, past family history, past medical history, past social history, past surgical history and problem list.    Review of Systems   Constitutional:  Negative for chills and fever.   HENT:  Negative for congestion, rhinorrhea and sore throat.    Eyes:  Negative for discharge, redness and itching.   Respiratory:  Positive for shortness of breath.  "Negative for chest tightness and wheezing.    Cardiovascular:  Negative for chest pain and palpitations.   Gastrointestinal:  Negative for abdominal pain, constipation and diarrhea.   Genitourinary:  Negative for dysuria.   Skin:  Negative for pallor and rash.   Neurological:  Negative for dizziness, weakness, numbness and headaches.         PHQ-2/9 Depression Screening    Little interest or pleasure in doing things: 0 - not at all  Feeling down, depressed, or hopeless: 0 - not at all  PHQ-2 Score: 0  PHQ-2 Interpretation: Negative depression screen             Objective:    /80 (BP Location: Left arm, Patient Position: Sitting, Cuff Size: Adult)   Pulse 68   Temp (!) 97.2 °F (36.2 °C) (Tympanic)   Resp 16   Ht 5' 4\" (1.626 m)   Wt 60.8 kg (134 lb)   SpO2 98%   BMI 23.00 kg/m²      Physical Exam  Vitals and nursing note reviewed.   Constitutional:       Appearance: Normal appearance.   HENT:      Head: Normocephalic and atraumatic.      Right Ear: Tympanic membrane, ear canal and external ear normal.      Left Ear: Tympanic membrane, ear canal and external ear normal.      Nose: Congestion and rhinorrhea present.      Right Turbinates: Enlarged, swollen and pale.      Left Turbinates: Enlarged, swollen and pale.      Mouth/Throat:      Mouth: Mucous membranes are moist.      Pharynx: No posterior oropharyngeal erythema.   Eyes:      General:         Right eye: No discharge.         Left eye: No discharge.      Conjunctiva/sclera: Conjunctivae normal.   Cardiovascular:      Rate and Rhythm: Normal rate and regular rhythm.      Heart sounds: No murmur heard.  Pulmonary:      Effort: Pulmonary effort is normal.      Breath sounds: Normal breath sounds. No wheezing.   Abdominal:      General: There is no distension.      Palpations: Abdomen is soft.      Tenderness: There is no abdominal tenderness.   Musculoskeletal:      Right lower leg: No edema.      Left lower leg: No edema.   Skin:     Findings: No " lesion or rash.   Neurological:      Mental Status: He is alert. Mental status is at baseline.   Psychiatric:         Mood and Affect: Mood normal.         Thought Content: Thought content normal.         Recent Results (from the past 8736 hour(s))   CBC and differential    Collection Time: 09/11/23  9:08 AM   Result Value Ref Range    WBC 5.20 4.31 - 10.16 Thousand/uL    RBC 5.12 3.88 - 5.62 Million/uL    Hemoglobin 14.6 12.0 - 17.0 g/dL    Hematocrit 44.7 36.5 - 49.3 %    MCV 87 82 - 98 fL    MCH 28.5 26.8 - 34.3 pg    MCHC 32.7 31.4 - 37.4 g/dL    RDW 12.5 11.6 - 15.1 %    MPV 10.3 8.9 - 12.7 fL    Platelets 222 149 - 390 Thousands/uL    nRBC 0 /100 WBCs    Segmented % 54 43 - 75 %    Immature Grans % 0 0 - 2 %    Lymphocytes % 34 14 - 44 %    Monocytes % 7 4 - 12 %    Eosinophils Relative 4 0 - 6 %    Basophils Relative 1 0 - 1 %    Absolute Neutrophils 2.82 1.85 - 7.62 Thousands/µL    Absolute Immature Grans 0.02 0.00 - 0.20 Thousand/uL    Absolute Lymphocytes 1.75 0.60 - 4.47 Thousands/µL    Absolute Monocytes 0.35 0.17 - 1.22 Thousand/µL    Eosinophils Absolute 0.23 0.00 - 0.61 Thousand/µL    Basophils Absolute 0.03 0.00 - 0.10 Thousands/µL   Comprehensive metabolic panel    Collection Time: 09/11/23  9:08 AM   Result Value Ref Range    Sodium 138 135 - 147 mmol/L    Potassium 3.9 3.5 - 5.3 mmol/L    Chloride 102 96 - 108 mmol/L    CO2 29 21 - 32 mmol/L    ANION GAP 7 mmol/L    BUN 13 5 - 25 mg/dL    Creatinine 0.78 0.60 - 1.30 mg/dL    Glucose, Fasting 117 (H) 65 - 99 mg/dL    Calcium 9.6 8.4 - 10.2 mg/dL    AST 15 13 - 39 U/L    ALT 13 7 - 52 U/L    Alkaline Phosphatase 65 34 - 104 U/L    Total Protein 8.0 6.4 - 8.4 g/dL    Albumin 4.5 3.5 - 5.0 g/dL    Total Bilirubin 0.78 0.20 - 1.00 mg/dL    eGFR 105 ml/min/1.73sq m   Lipid panel    Collection Time: 09/11/23  9:08 AM   Result Value Ref Range    Cholesterol 197 See Comment mg/dL    Triglycerides 129 See Comment mg/dL    HDL, Direct 40 >=40 mg/dL    LDL  Calculated 131 (H) 0 - 100 mg/dL    Non-HDL-Chol (CHOL-HDL) 157 mg/dl   Hemoglobin A1C    Collection Time: 09/11/23  9:08 AM   Result Value Ref Range    Hemoglobin A1C 6.5 (H) Normal 4.0-5.6%; PreDiabetic 5.7-6.4%; Diabetic >=6.5%; Glycemic control for adults with diabetes <7.0% %     mg/dl   TSH, 3rd generation with Free T4 reflex    Collection Time: 09/11/23  9:08 AM   Result Value Ref Range    TSH 3RD GENERATON 0.798 0.450 - 4.500 uIU/mL   Hepatitis C Antibody    Collection Time: 09/11/23  9:08 AM   Result Value Ref Range    Hepatitis C Ab Non-reactive Non-Reactive   HIV 1/2 AG/AB w Reflex [facility] for 2 yr old and above    Collection Time: 09/11/23  9:08 AM   Result Value Ref Range    HIV-1 p24 Antigen Non-Reactive Non-Reactive    HIV-1 Antibody Non-Reactive Non-Reactive    HIV-2 Antibody Non-Reactive Non-Reactive    HIV Ag-Ab 5th Gen Non-Reactive Non-Reactive   PSA, total and free    Collection Time: 09/11/23  9:08 AM   Result Value Ref Range    Prostate Specific Antigen Total 0.6 0.0 - 4.0 ng/mL    PSA, Free 0.30 N/A ng/mL    PSA, Free Pct 50.0 %   Vitamin D 25 hydroxy    Collection Time: 09/11/23  9:08 AM   Result Value Ref Range    Vit D, 25-Hydroxy 18.6 (L) 30.0 - 100.0 ng/mL   Vitamin B12    Collection Time: 09/11/23  9:08 AM   Result Value Ref Range    Vitamin B-12 302 180 - 914 pg/mL   Folate    Collection Time: 09/11/23  9:08 AM   Result Value Ref Range    Folate 14.4 >5.9 ng/mL   Hemoglobin A1C    Collection Time: 12/11/23  8:28 AM   Result Value Ref Range    Hemoglobin A1C 6.2 (H) Normal 4.0-5.6%; PreDiabetic 5.7-6.4%; Diabetic >=6.5%; Glycemic control for adults with diabetes <7.0% %     mg/dl   Comprehensive metabolic panel    Collection Time: 12/11/23  8:28 AM   Result Value Ref Range    Sodium 139 135 - 147 mmol/L    Potassium 4.0 3.5 - 5.3 mmol/L    Chloride 103 96 - 108 mmol/L    CO2 29 21 - 32 mmol/L    ANION GAP 7 mmol/L    BUN 11 5 - 25 mg/dL    Creatinine 0.74 0.60 - 1.30 mg/dL     Glucose, Fasting 115 (H) 65 - 99 mg/dL    Calcium 9.8 8.4 - 10.2 mg/dL    AST 17 13 - 39 U/L    ALT 18 7 - 52 U/L    Alkaline Phosphatase 71 34 - 104 U/L    Total Protein 7.9 6.4 - 8.4 g/dL    Albumin 4.6 3.5 - 5.0 g/dL    Total Bilirubin 0.67 0.20 - 1.00 mg/dL    eGFR 106 ml/min/1.73sq m       Laboratory Results: I have personally reviewed the pertinent laboratory results/reports     Radiology/Other Diagnostic Testing Results: I have personally reviewed pertinent reports.      XR chest pa & lateral    Result Date: 5/2/2018  CHEST INDICATION:   R05: Cough. COMPARISON:  None EXAM PERFORMED/VIEWS:  XR CHEST PA & LATERAL FINDINGS: Cardiomediastinal silhouette appears unremarkable. The lungs are clear.  No pneumothorax or pleural effusion. Osseous structures appear within normal limits for patient age.     No acute consolidation or congestion seen Workstation performed: [id number]        Assessment/Plan:  1. Exercise-induced asthma  -     albuterol (Proventil HFA) 90 mcg/act inhaler; Inhale 2 puffs every 6 (six) hours as needed for wheezing or shortness of breath (20 min before exercise)  -     cetirizine (ZyrTEC) 10 mg tablet; Take 1 tablet (10 mg total) by mouth daily  2. Seasonal allergic rhinitis due to pollen  -     cetirizine (ZyrTEC) 10 mg tablet; Take 1 tablet (10 mg total) by mouth daily  -     fluticasone (FLONASE) 50 mcg/act nasal spray; 1 spray into each nostril daily      It is possible that he has exercise-induced asthma.  Start taking albuterol inhaler 2 puffs 20 minutes before exercising or exercising or for shortness of breath or wheezing and can repeat every 4-6 hours as needed.  Rinse mouth after each use.    He also has underlying uncontrolled seasonal allergies you can start using cetirizine 10 mg daily and Flonase nasal spray to decrease the nasal secretions.  In order to use the Flonase appointment nozzle inside the nostril towards the ear of that side and gently sniff.  Do not sniff too hard as  "you will swallow the medication instead of allowing it to work in the nostril.             Read package inserts for all medications before starting a new medications, call me if you have any questions.    Patient was given opportunity to ask questions and all questions were answered.    Disclaimer: Portions of the record may have been created with voice recognition software. Occasional wrong word or \"sound a like\" substitutions may have occurred due to the inherent limitations of voice recognition software. Read the chart carefully and recognize, using context, where substitutions have occurred. I have used the Epic copy/forward function to compose this note. I have reviewed my current note to ensure it reflects the current patient status, exam, assessment and plan.    "

## 2024-12-01 DIAGNOSIS — E11.9 TYPE 2 DIABETES MELLITUS WITHOUT COMPLICATION, WITHOUT LONG-TERM CURRENT USE OF INSULIN (HCC): ICD-10-CM

## 2024-12-03 RX ORDER — ATORVASTATIN CALCIUM 10 MG/1
10 TABLET, FILM COATED ORAL
Qty: 30 TABLET | Refills: 5 | Status: SHIPPED | OUTPATIENT
Start: 2024-12-03

## 2025-03-11 ENCOUNTER — OFFICE VISIT (OUTPATIENT)
Dept: FAMILY MEDICINE CLINIC | Facility: CLINIC | Age: 55
End: 2025-03-11
Payer: COMMERCIAL

## 2025-03-11 ENCOUNTER — RESULTS FOLLOW-UP (OUTPATIENT)
Dept: FAMILY MEDICINE CLINIC | Facility: CLINIC | Age: 55
End: 2025-03-11

## 2025-03-11 VITALS
HEIGHT: 64 IN | OXYGEN SATURATION: 98 % | WEIGHT: 131 LBS | HEART RATE: 72 BPM | BODY MASS INDEX: 22.36 KG/M2 | DIASTOLIC BLOOD PRESSURE: 64 MMHG | RESPIRATION RATE: 16 BRPM | SYSTOLIC BLOOD PRESSURE: 104 MMHG | TEMPERATURE: 96.9 F

## 2025-03-11 DIAGNOSIS — Z00.01 ENCOUNTER FOR GENERAL ADULT MEDICAL EXAMINATION WITH ABNORMAL FINDINGS: Primary | ICD-10-CM

## 2025-03-11 DIAGNOSIS — J30.1 SEASONAL ALLERGIC RHINITIS DUE TO POLLEN: ICD-10-CM

## 2025-03-11 DIAGNOSIS — E11.9 TYPE 2 DIABETES MELLITUS WITHOUT COMPLICATION, WITHOUT LONG-TERM CURRENT USE OF INSULIN (HCC): ICD-10-CM

## 2025-03-11 DIAGNOSIS — J45.990 EXERCISE-INDUCED ASTHMA: ICD-10-CM

## 2025-03-11 DIAGNOSIS — Z13.6 SCREENING FOR CARDIOVASCULAR CONDITION: ICD-10-CM

## 2025-03-11 DIAGNOSIS — Z13.1 SCREENING FOR DIABETES MELLITUS: ICD-10-CM

## 2025-03-11 DIAGNOSIS — Z23 ENCOUNTER FOR IMMUNIZATION: ICD-10-CM

## 2025-03-11 DIAGNOSIS — Z11.1 ENCOUNTER FOR SCREENING FOR RESPIRATORY TUBERCULOSIS: ICD-10-CM

## 2025-03-11 DIAGNOSIS — Z13.29 THYROID DISORDER SCREENING: ICD-10-CM

## 2025-03-11 PROBLEM — R73.03 PREDIABETES: Status: ACTIVE | Noted: 2025-03-11

## 2025-03-11 PROBLEM — R73.03 PREDIABETES: Status: RESOLVED | Noted: 2025-03-11 | Resolved: 2025-03-11

## 2025-03-11 LAB
CREAT UR-MCNC: 137.6 MG/DL
LEFT EYE DIABETIC RETINOPATHY: ABNORMAL
LEFT EYE IMAGE QUALITY: ABNORMAL
LEFT EYE MACULAR EDEMA: ABNORMAL
LEFT EYE OTHER RETINOPATHY: ABNORMAL
MICROALBUMIN UR-MCNC: 43.7 MG/L
MICROALBUMIN/CREAT 24H UR: 32 MG/G CREATININE (ref 0–30)
RIGHT EYE DIABETIC RETINOPATHY: ABNORMAL
RIGHT EYE IMAGE QUALITY: ABNORMAL
RIGHT EYE MACULAR EDEMA: ABNORMAL
RIGHT EYE OTHER RETINOPATHY: ABNORMAL
SEVERITY (EYE EXAM): ABNORMAL

## 2025-03-11 PROCEDURE — 82570 ASSAY OF URINE CREATININE: CPT | Performed by: FAMILY MEDICINE

## 2025-03-11 PROCEDURE — 99396 PREV VISIT EST AGE 40-64: CPT | Performed by: FAMILY MEDICINE

## 2025-03-11 PROCEDURE — 82043 UR ALBUMIN QUANTITATIVE: CPT | Performed by: FAMILY MEDICINE

## 2025-03-11 PROCEDURE — 99214 OFFICE O/P EST MOD 30 MIN: CPT | Performed by: FAMILY MEDICINE

## 2025-03-11 RX ORDER — METFORMIN HYDROCHLORIDE 500 MG/1
500 TABLET, EXTENDED RELEASE ORAL
Qty: 90 TABLET | Refills: 1 | Status: SHIPPED | OUTPATIENT
Start: 2025-03-11

## 2025-03-11 RX ORDER — CETIRIZINE HYDROCHLORIDE 10 MG/1
10 TABLET ORAL DAILY
Qty: 30 TABLET | Refills: 5 | Status: SHIPPED | OUTPATIENT
Start: 2025-03-11

## 2025-03-11 RX ORDER — ALBUTEROL SULFATE 90 UG/1
2 INHALANT RESPIRATORY (INHALATION) EVERY 6 HOURS PRN
Qty: 6.7 G | Refills: 1 | Status: SHIPPED | OUTPATIENT
Start: 2025-03-11

## 2025-03-11 RX ORDER — ATORVASTATIN CALCIUM 10 MG/1
10 TABLET, FILM COATED ORAL
Qty: 90 TABLET | Refills: 1 | Status: SHIPPED | OUTPATIENT
Start: 2025-03-11

## 2025-03-11 NOTE — PROGRESS NOTES
Adult Annual Physical  Name: Marcos Washington      : 1970      MRN: [id number]  Encounter Provider: Judit Villegas MD  Encounter Date: 3/11/2025   Encounter department: [facility] MEDICINE    Assessment & Plan  Encounter for general adult medical examination with abnormal findings    Orders:    CBC and differential; Future    Comprehensive metabolic panel; Future    Hemoglobin A1C; Future    Lipid panel; Future    TSH, 3rd generation with Free T4 reflex; Future    Screening for diabetes mellitus    Orders:    Hemoglobin A1C; Future    Screening for cardiovascular condition    Orders:    Lipid panel; Future    Thyroid disorder screening    Orders:    TSH, 3rd generation with Free T4 reflex; Future    Exercise-induced asthma  Shortness of breath is likely due to exertional and seasonal asthma advised to use albuterol as needed and start cetirizine again  Orders:    albuterol (PROVENTIL HFA,VENTOLIN HFA) 90 mcg/act inhaler; Inhale 2 puffs every 6 (six) hours as needed for wheezing or shortness of breath (20 min before exercise)    cetirizine (ZyrTEC) 10 mg tablet; Take 1 tablet (10 mg total) by mouth daily    Seasonal allergic rhinitis due to pollen    Orders:    cetirizine (ZyrTEC) 10 mg tablet; Take 1 tablet (10 mg total) by mouth daily    Type 2 diabetes mellitus without complication, without long-term current use of insulin (HCC)  Continue metformin, start a atorvastatin, ASCVD risk is 6.8%  Low-carb diet, annual diabetic eye exam, foot exam done today.  IRIS exam will need to be repeated as it was an inconclusive test  Read package inserts for all medications before starting a new medications, call me if you have any questions.    Orders:    Albumin / creatinine urine ratio    atorvastatin (LIPITOR) 10 mg tablet; Take 1 tablet (10 mg total) by mouth daily at bedtime    metFORMIN (GLUCOPHAGE-XR) 500 mg 24 hr tablet; Take 1 tablet (500 mg total) by mouth daily with dinner    IRIS Diabetic eye  exam    Encounter for immunization    Orders:    Zoster Vaccine Recombinant IM      Preventive Screenings:  - Diabetes Screening: screening not indicated and has diabetes  - Hepatitis C screening: screening up-to-date   - HIV screening: screening up-to-date   - Colon cancer screening: screening up-to-date   - Lung cancer screening: screening not indicated     Immunizations:  - Immunizations due: Influenza, Tdap and Zoster (Shingrix)      Depression Screening and Follow-up Plan: Patient was screened for depression during today's encounter. They screened negative with a PHQ-2 score of 0.          History of Present Illness     Adult Annual Physical:  Patient presents for annual physical. With history of prediabetes presents with the complaints of worsening shortness of breath and congestion for the past 1 day.  He has been compliant with metformin but recently stopped as he ran out and no recent labs or follow-up.  He has also been off cetirizine and albuterol..     Diet and Physical Activity:  - Diet/Nutrition: well balanced diet and consuming 3-5 servings of fruits/vegetables daily.  - Exercise: moderate cardiovascular exercise.    Depression Screening:  - PHQ-2 Score: 0    General Health:  - Sleep: sleeps well.  - Hearing: normal hearing bilateral ears.  - Vision: goes for regular eye exams.  - Dental: regular dental visits.    Review of Systems   Constitutional:  Negative for chills and fever.   HENT:  Positive for congestion. Negative for rhinorrhea and sore throat.    Eyes:  Negative for discharge, redness and itching.   Respiratory:  Positive for shortness of breath and wheezing. Negative for chest tightness.    Cardiovascular:  Negative for chest pain and palpitations.   Gastrointestinal:  Negative for abdominal pain, constipation and diarrhea.   Genitourinary:  Negative for dysuria.   Skin:  Negative for pallor and rash.   Neurological:  Negative for dizziness, weakness, numbness and headaches.     Medical  "History Reviewed by provider this encounter:  Tobacco  Allergies  Meds  Problems  Med Hx  Surg Hx  Fam Hx     .  Current Outpatient Medications on File Prior to Visit   Medication Sig Dispense Refill    [DISCONTINUED] albuterol (PROVENTIL HFA,VENTOLIN HFA) 90 mcg/act inhaler INHALE 2 PUFFS EVERY 6 (SIX) HOURS AS NEEDED FOR WHEEZING OR SHORTNESS OF BREATH (20 MIN BEFORE EXERCISE) 6.7 g 1    [DISCONTINUED] atorvastatin (LIPITOR) 10 mg tablet TAKE 1 TABLET BY MOUTH DAILY AT BEDTIME 30 tablet 5    [DISCONTINUED] cetirizine (ZyrTEC) 10 mg tablet TAKE 1 TABLET BY MOUTH EVERY DAY 30 tablet 5    [DISCONTINUED] fluticasone (FLONASE) 50 mcg/act nasal spray SPRAY 1 SPRAY INTO EACH NOSTRIL EVERY DAY (Patient not taking: Reported on 3/11/2025) 16 mL 1    [DISCONTINUED] metFORMIN (GLUCOPHAGE) 500 mg tablet TAKE 1 TABLET BY MOUTH EVERY DAY WITH BREAKFAST (Patient not taking: Reported on 3/11/2025) 90 tablet 1     No current facility-administered medications on file prior to visit.      Social History     Tobacco Use    Smoking status: Never    Smokeless tobacco: Never   Vaping Use    Vaping status: Never Used   Substance and Sexual Activity    Alcohol use: No    Drug use: No    Sexual activity: Yes     Partners: Female       Objective   /64 (BP Location: Left arm, Patient Position: Sitting, Cuff Size: Adult)   Pulse 72   Temp (!) 96.9 °F (36.1 °C) (Tympanic)   Resp 16   Ht 5' 4\" (1.626 m)   Wt 59.4 kg (131 lb)   SpO2 98%   BMI 22.49 kg/m²     Physical Exam  Vitals and nursing note reviewed.   Constitutional:       General: He is not in acute distress.     Appearance: Normal appearance. He is well-developed and normal weight. He is not ill-appearing or toxic-appearing.   HENT:      Head: Normocephalic and atraumatic.      Right Ear: Tympanic membrane, ear canal and external ear normal.      Left Ear: Tympanic membrane, ear canal and external ear normal.      Nose: No mucosal edema or rhinorrhea.      " Mouth/Throat:      Mouth: Mucous membranes are moist. Mucous membranes are not pale and not cyanotic.      Pharynx: Oropharynx is clear. No oropharyngeal exudate or posterior oropharyngeal erythema.   Eyes:      General: No scleral icterus.        Right eye: No discharge.         Left eye: No discharge.      Extraocular Movements: Extraocular movements intact.      Conjunctiva/sclera: Conjunctivae normal.      Pupils: Pupils are equal, round, and reactive to light.   Cardiovascular:      Rate and Rhythm: Normal rate and regular rhythm.      Pulses: no weak pulses.           Dorsalis pedis pulses are 2+ on the right side and 2+ on the left side.      Heart sounds: Normal heart sounds. No murmur heard.     No gallop.   Pulmonary:      Effort: Pulmonary effort is normal. No respiratory distress.      Breath sounds: Normal breath sounds. No wheezing or rales.   Abdominal:      General: Abdomen is flat.      Palpations: Abdomen is soft.      Tenderness: There is no abdominal tenderness.   Musculoskeletal:         General: No swelling, tenderness, deformity or signs of injury.      Cervical back: Normal range of motion.      Right lower leg: No edema.      Left lower leg: No edema.   Feet:      Right foot:      Skin integrity: No ulcer, skin breakdown, erythema, warmth, callus or dry skin.      Left foot:      Skin integrity: No ulcer, skin breakdown, erythema, warmth, callus or dry skin.   Skin:     General: Skin is warm.      Coloration: Skin is not jaundiced or pale.      Findings: No rash.   Neurological:      General: No focal deficit present.      Mental Status: He is alert and oriented to person, place, and time.   Psychiatric:         Mood and Affect: Mood normal.         Behavior: Behavior normal.         Thought Content: Thought content normal.         Judgment: Judgment normal.       Patient's shoes and socks removed.    Right Foot/Ankle   Right Foot Inspection  Skin Exam: skin normal. Skin not intact, no dry  skin, no warmth, no callus, no erythema, no maceration, no abnormal color, no pre-ulcer, no ulcer and no callus.     Toe Exam: ROM and strength within normal limits.     Sensory   Monofilament testing: intact    Vascular  Capillary refills: < 3 seconds  The right DP pulse is 2+.     Right Toe  - Comprehensive Exam  Ecchymosis: none  Swelling: none   Tenderness: none       Left Foot/Ankle  Left Foot Inspection  Skin Exam: skin normal. Skin not intact, no dry skin, no warmth, no erythema, no maceration, normal color, no pre-ulcer, no ulcer and no callus.     Toe Exam: ROM and strength within normal limits.     Sensory   Monofilament testing: intact    Vascular  Capillary refills: < 3 seconds  The left DP pulse is 2+.     Left Toe  - Comprehensive Exam  Ecchymosis: none  Swelling: none   Tenderness: none       Assign Risk Category  No deformity present  No loss of protective sensation  No weak pulses  Risk: 0

## 2025-03-11 NOTE — ASSESSMENT & PLAN NOTE
Continue metformin, start a atorvastatin, ASCVD risk is 6.8%  Low-carb diet, annual diabetic eye exam, foot exam done today.  IRIS exam will need to be repeated as it was an inconclusive test  Read package inserts for all medications before starting a new medications, call me if you have any questions.    Orders:    Albumin / creatinine urine ratio    atorvastatin (LIPITOR) 10 mg tablet; Take 1 tablet (10 mg total) by mouth daily at bedtime    metFORMIN (GLUCOPHAGE-XR) 500 mg 24 hr tablet; Take 1 tablet (500 mg total) by mouth daily with dinner    IRIS Diabetic eye exam

## 2025-03-11 NOTE — ASSESSMENT & PLAN NOTE
Shortness of breath is likely due to exertional and seasonal asthma advised to use albuterol as needed and start cetirizine again  Orders:    albuterol (PROVENTIL HFA,VENTOLIN HFA) 90 mcg/act inhaler; Inhale 2 puffs every 6 (six) hours as needed for wheezing or shortness of breath (20 min before exercise)    cetirizine (ZyrTEC) 10 mg tablet; Take 1 tablet (10 mg total) by mouth daily

## 2025-03-11 NOTE — PATIENT INSTRUCTIONS
"Patient Education     Routine physical for adults   The Basics   Written by the doctors and editors at [facility]   What is a physical? -- A physical is a routine visit, or \"check-up,\" with your doctor. You might also hear it called a \"wellness visit\" or \"preventive visit.\"  During each visit, the doctor will:   Ask about your physical and mental health   Ask about your habits, behaviors, and lifestyle   Do an exam   Give you vaccines if needed   Talk to you about any medicines you take   Give advice about your health   Answer your questions  Getting regular check-ups is an important part of taking care of your health. It can help your doctor find and treat any problems you have. But it's also important for preventing health problems.  A routine physical is different from a \"sick visit.\" A sick visit is when you see a doctor because of a health concern or problem. Since physicals are scheduled ahead of time, you can think about what you want to ask the doctor.  How often should I get a physical? -- It depends on your age and health. In general, for people age 21 years and older:   If you are younger than 50 years, you might be able to get a physical every 3 years.   If you are 50 years or older, your doctor might recommend a physical every year.  If you have an ongoing health condition, like diabetes or high blood pressure, your doctor will probably want to see you more often.  What happens during a physical? -- In general, each visit will include:   Physical exam - The doctor or nurse will check your height, weight, heart rate, and blood pressure. They will also look at your eyes and ears. They will ask about how you are feeling and whether you have any symptoms that bother you.   Medicines - It's a good idea to bring a list of all the medicines you take to each doctor visit. Your doctor will talk to you about your medicines and answer any questions. Tell them if you are having any side effects that bother you. You " "should also tell them if you are having trouble paying for any of your medicines.   Habits and behaviors - This includes:   Your diet   Your exercise habits   Whether you smoke, drink alcohol, or use drugs   Whether you are sexually active   Whether you feel safe at home  Your doctor will talk to you about things you can do to improve your health and lower your risk of health problems. They will also offer help and support. For example, if you want to quit smoking, they can give you advice and might prescribe medicines. If you want to improve your diet or get more physical activity, they can help you with this, too.   Lab tests, if needed - The tests you get will depend on your age and situation. For example, your doctor might want to check your:   Cholesterol   Blood sugar   Iron level   Vaccines - The recommended vaccines will depend on your age, health, and what vaccines you already had. Vaccines are very important because they can prevent certain serious or deadly infections.   Discussion of screening - \"Screening\" means checking for diseases or other health problems before they cause symptoms. Your doctor can recommend screening based on your age, risk, and preferences. This might include tests to check for:   Cancer, such as breast, prostate, cervical, ovarian, colorectal, prostate, lung, or skin cancer   Sexually transmitted infections, such as chlamydia and gonorrhea   Mental health conditions like depression and anxiety  Your doctor will talk to you about the different types of screening tests. They can help you decide which screenings to have. They can also explain what the results might mean.   Answering questions - The physical is a good time to ask the doctor or nurse questions about your health. If needed, they can refer you to other doctors or specialists, too.  Adults older than 65 years often need other care, too. As you get older, your doctor will talk to you about:   How to prevent falling at " home   Hearing or vision tests   Memory testing   How to take your medicines safely   Making sure that you have the help and support you need at home  All topics are updated as new evidence becomes available and our peer review process is complete.  This topic retrieved from [employer] on: May 02, 2024.  Topic 198558 Version 1.0  Release: 32.4.3 - C32.122  © 2024 [employer] and/or its affiliates. All rights reserved.  Consumer Information Use and Disclaimer   Disclaimer: This generalized information is a limited summary of diagnosis, treatment, and/or medication information. It is not meant to be comprehensive and should be used as a tool to help the user understand and/or assess potential diagnostic and treatment options. It does NOT include all information about conditions, treatments, medications, side effects, or risks that may apply to a specific patient. It is not intended to be medical advice or a substitute for the medical advice, diagnosis, or treatment of a health care provider based on the health care provider's examination and assessment of a patient's specific and unique circumstances. Patients must speak with a health care provider for complete information about their health, medical questions, and treatment options, including any risks or benefits regarding use of medications. This information does not endorse any treatments or medications as safe, effective, or approved for treating a specific patient. [employer] and its affiliates disclaim any warranty or liability relating to this information or the use thereof.The use of this information is governed by the Terms of Use, available at https://www.woltersKhushuwer.com/en/know/clinical-effectiveness-terms. 2024© [employer] and its affiliates and/or licensors. All rights reserved.  Copyright   © 2024 [employer] and/or its affiliates. All rights reserved.

## 2025-03-12 ENCOUNTER — RESULTS FOLLOW-UP (OUTPATIENT)
Dept: FAMILY MEDICINE CLINIC | Facility: CLINIC | Age: 55
End: 2025-03-12

## 2025-03-12 LAB
ALBUMIN SERPL-MCNC: 4.6 G/DL (ref 3.5–5.7)
ALP SERPL-CCNC: 65 U/L (ref 35–120)
ALT SERPL-CCNC: 35 U/L
ANION GAP SERPL CALCULATED.3IONS-SCNC: 6 MMOL/L (ref 3–11)
AST SERPL-CCNC: 21 U/L
BASOPHILS # BLD AUTO: 0 THOU/CMM (ref 0–0.1)
BASOPHILS NFR BLD AUTO: 1 %
BILIRUB SERPL-MCNC: 0.5 MG/DL (ref 0.2–1)
BUN SERPL-MCNC: 13 MG/DL (ref 7–28)
CALCIUM SERPL-MCNC: 10.3 MG/DL (ref 8.5–10.5)
CHLORIDE SERPL-SCNC: 103 MMOL/L (ref 100–109)
CHOLEST SERPL-MCNC: 153 MG/DL
CHOLEST/HDLC SERPL: 3.1 {RATIO}
CO2 SERPL-SCNC: 30 MMOL/L (ref 21–31)
CREAT SERPL-MCNC: 0.76 MG/DL (ref 0.53–1.3)
CYTOLOGY CMNT CVX/VAG CYTO-IMP: ABNORMAL
DIFFERENTIAL METHOD BLD: NORMAL
EOSINOPHIL # BLD AUTO: 0.2 THOU/CMM (ref 0–0.5)
EOSINOPHIL NFR BLD AUTO: 2 %
ERYTHROCYTE [DISTWIDTH] IN BLOOD BY AUTOMATED COUNT: 13.9 % (ref 12–16)
EST. AVERAGE GLUCOSE BLD GHB EST-MCNC: 140 MG/DL
GFR/BSA.PRED SERPLBLD CYS-BASED-ARV: 108 ML/MIN/{1.73_M2}
GLUCOSE SERPL-MCNC: 130 MG/DL (ref 65–99)
HBA1C MFR BLD: 6.5 %
HCT VFR BLD AUTO: 42.5 % (ref 37–48)
HDLC SERPL-MCNC: 50 MG/DL (ref 23–92)
HGB BLD-MCNC: 14.1 G/DL (ref 12.5–17)
LDLC SERPL CALC-MCNC: 82 MG/DL
LYMPHOCYTES # BLD AUTO: 1.9 THOU/CMM (ref 1–3)
LYMPHOCYTES NFR BLD AUTO: 26 %
MCH RBC QN AUTO: 29.5 PG (ref 27–36)
MCHC RBC AUTO-ENTMCNC: 33.1 G/DL (ref 32–37)
MCV RBC AUTO: 89 FL (ref 80–100)
MONOCYTES # BLD AUTO: 0.4 THOU/CMM (ref 0.3–1)
MONOCYTES NFR BLD AUTO: 6 %
NEUTROPHILS # BLD AUTO: 4.8 THOU/CMM (ref 1.8–7.8)
NEUTROPHILS NFR BLD AUTO: 65 %
NONHDLC SERPL-MCNC: 103 MG/DL
PLATELET # BLD AUTO: 208 THOU/CMM (ref 140–350)
PMV BLD REES-ECKER: 8.3 FL (ref 7.5–11.3)
POTASSIUM SERPL-SCNC: 4.8 MMOL/L (ref 3.5–5.2)
PROT SERPL-MCNC: 7.7 G/DL (ref 6.3–8.3)
RBC # BLD AUTO: 4.76 MILL/CMM (ref 4–5.4)
SODIUM SERPL-SCNC: 139 MMOL/L (ref 135–145)
TRIGL SERPL-MCNC: 104 MG/DL
TSH SERPL-ACNC: 1.18 UIU/ML (ref 0.45–5.33)
WBC # BLD AUTO: 7.4 THOU/CMM (ref 4–10.5)

## 2025-05-15 DIAGNOSIS — E11.9 TYPE 2 DIABETES MELLITUS WITHOUT COMPLICATION, WITHOUT LONG-TERM CURRENT USE OF INSULIN (HCC): Primary | ICD-10-CM
